# Patient Record
Sex: FEMALE | NOT HISPANIC OR LATINO | URBAN - METROPOLITAN AREA
[De-identification: names, ages, dates, MRNs, and addresses within clinical notes are randomized per-mention and may not be internally consistent; named-entity substitution may affect disease eponyms.]

---

## 2017-03-02 ENCOUNTER — IMPORTED ENCOUNTER (OUTPATIENT)
Dept: URBAN - METROPOLITAN AREA CLINIC 1 | Facility: CLINIC | Age: 42
End: 2017-03-02

## 2017-03-02 PROBLEM — H04.123: Noted: 2017-03-02

## 2017-03-02 PROCEDURE — 92015 DETERMINE REFRACTIVE STATE: CPT

## 2017-03-02 PROCEDURE — 92004 COMPRE OPH EXAM NEW PT 1/>: CPT

## 2017-03-02 NOTE — PATIENT DISCUSSION
1.  Dry Eyes OU -- Recommended to patient to use Artificial Tears BID OU. Erx Restasis BID OU 2. Return for an appointment in 1 week for Contact lens check. with Dr. Terrence Dawn. 3.  Return for an appointment in 1 year for 30 and Contact lens check. with Dr. Terrence Dawn.

## 2017-03-10 ENCOUNTER — IMPORTED ENCOUNTER (OUTPATIENT)
Dept: URBAN - METROPOLITAN AREA CLINIC 1 | Facility: CLINIC | Age: 42
End: 2017-03-10

## 2018-04-27 ENCOUNTER — IMPORTED ENCOUNTER (OUTPATIENT)
Dept: URBAN - METROPOLITAN AREA CLINIC 1 | Facility: CLINIC | Age: 43
End: 2018-04-27

## 2018-04-27 PROBLEM — H52.4: Noted: 2018-04-27

## 2018-04-27 PROCEDURE — S0621 ROUTINE OPHTHALMOLOGICAL EXA: HCPCS

## 2018-04-27 NOTE — PATIENT DISCUSSION
1.  Presbyopia: Rx was given for corrective spectacles if indicated. 2.  REESE OU-Cont using AT BID OU. Patient was unable to get Restasis secondary to cost3. Return for an appointment in 1 year for 40 and Contact lens check. with Dr. Heri Emmanuel.

## 2019-05-29 ENCOUNTER — IMPORTED ENCOUNTER (OUTPATIENT)
Dept: URBAN - METROPOLITAN AREA CLINIC 1 | Facility: CLINIC | Age: 44
End: 2019-05-29

## 2019-05-29 PROBLEM — H52.4: Noted: 2019-05-29

## 2019-05-29 PROBLEM — H52.13: Noted: 2019-05-29

## 2019-05-29 PROCEDURE — S0621 ROUTINE OPHTHALMOLOGICAL EXA: HCPCS

## 2019-05-29 NOTE — PATIENT DISCUSSION
1. Myopia -- Rx was given for correction if indicated and requested. 2. Presbyopia 3. REESE w/ PEK OU -- Cont using AT BID OU. CTL Trials given today (Air Optix Hydroglyde vs. Aqua) to improve comfort. Return for an appointment in 1 year for a 40/cc with Dr. Kale Cadet.

## 2020-06-01 ENCOUNTER — IMPORTED ENCOUNTER (OUTPATIENT)
Dept: URBAN - METROPOLITAN AREA CLINIC 1 | Facility: CLINIC | Age: 45
End: 2020-06-01

## 2020-06-01 PROBLEM — H52.13: Noted: 2020-06-01

## 2020-06-01 PROBLEM — H04.123: Noted: 2020-06-01

## 2020-06-01 PROCEDURE — S0621 ROUTINE OPHTHALMOLOGICAL EXA: HCPCS

## 2020-06-01 NOTE — PATIENT DISCUSSION
1. Myopia: Rx was given for glasses and contacts. 2. Dry Eyes OU -The use/continuation of artificial tears were recommended. 3.  Return for an appointment in 1 year for 40cc with Dr. Esmer Pitt.

## 2020-09-28 ENCOUNTER — IMPORTED ENCOUNTER (OUTPATIENT)
Dept: URBAN - METROPOLITAN AREA CLINIC 1 | Facility: CLINIC | Age: 45
End: 2020-09-28

## 2020-09-28 PROBLEM — H20.011: Noted: 2020-09-28

## 2020-09-28 PROCEDURE — 92012 INTRM OPH EXAM EST PATIENT: CPT

## 2020-09-28 NOTE — PATIENT DISCUSSION
1.  Iritis -- Primary episode OD. Begin Pred forte QID OD (Erx'd). Return immediately if symptoms worsen. Return for an appointment in 1 week 10 with Dr. Magan Cunningham.

## 2020-09-28 NOTE — PATIENT DISCUSSION
Iritis: Primary episode OD:  Pred forte QID to affected eye. Return immediately if symptoms worsen. Leg bag attached to reyes per Dr. Kumar 78.

## 2020-10-05 ENCOUNTER — IMPORTED ENCOUNTER (OUTPATIENT)
Dept: URBAN - METROPOLITAN AREA CLINIC 1 | Facility: CLINIC | Age: 45
End: 2020-10-05

## 2020-10-05 PROBLEM — H20.011: Noted: 2020-10-05

## 2020-10-05 PROCEDURE — 92012 INTRM OPH EXAM EST PATIENT: CPT

## 2020-10-05 NOTE — PATIENT DISCUSSION
1.  Iritis -- Primary episode OD. Continue Pred forte QID OD x 1 week then TID OD x 1 week (Erx'd). Return immediately if symptoms worsen. Return for an appointment in 2 weeks 10 with Dr. Nasim Mckee.

## 2020-10-21 ENCOUNTER — IMPORTED ENCOUNTER (OUTPATIENT)
Dept: URBAN - METROPOLITAN AREA CLINIC 1 | Facility: CLINIC | Age: 45
End: 2020-10-21

## 2020-10-21 PROBLEM — H20.011: Noted: 2020-10-21

## 2020-10-21 PROCEDURE — 92012 INTRM OPH EXAM EST PATIENT: CPT

## 2020-10-21 NOTE — PATIENT DISCUSSION
1.  Iritis OD -- Improved. Decrease Pred forte to BID OD x 1 week then QD OD x 1 week then d/c. Return immediately if symptoms worsen. Pt ok to resume CTL wear after 1 week2. REESE w/ PEK OU - Recommend ATs BID OU. Return for an appointment in June 2021 for a 40/cc with Dr. Joni Schaumann.

## 2021-06-02 ENCOUNTER — IMPORTED ENCOUNTER (OUTPATIENT)
Dept: URBAN - METROPOLITAN AREA CLINIC 1 | Facility: CLINIC | Age: 46
End: 2021-06-02

## 2021-06-02 PROBLEM — H20.021: Noted: 2021-06-02

## 2021-06-02 PROBLEM — H16.143: Noted: 2021-06-02

## 2021-06-02 PROBLEM — H04.123: Noted: 2021-06-02

## 2021-06-02 PROCEDURE — 92012 INTRM OPH EXAM EST PATIENT: CPT

## 2021-06-02 NOTE — PATIENT DISCUSSION
1.  Iritis OD - Change Pred Forte to affected eye TID x1 week then decreased to BID x1 week. Return immediately if symptoms worsen. Consider additional testing PRN if condition becomes more recurrent. Hold CTL wear until seen back. 2.  REESE w/ PEK OU - Continue ATs TID-QID OU routinely. Return for an appointment in 2 week 10 (iritis recheck) with Dr. Margie King.

## 2021-06-16 ENCOUNTER — IMPORTED ENCOUNTER (OUTPATIENT)
Dept: URBAN - METROPOLITAN AREA CLINIC 1 | Facility: CLINIC | Age: 46
End: 2021-06-16

## 2021-06-16 PROBLEM — H20.011: Noted: 2021-06-16

## 2021-06-16 PROCEDURE — 99213 OFFICE O/P EST LOW 20 MIN: CPT

## 2021-06-16 NOTE — PATIENT DISCUSSION
1.  Iritis OD -- Cont Pred Forte BID OD until Friday then taper to QHS OD x 1 week then D/c. May resume CTLs tomorrow. Consider HLA-B27 testing in the future if Iritis continues to flare. 2.  REESE w/ PEK OU -- Continue ATs TID OU routinely. 3. CTL Wear -- Air Optix Hydraglyde Return for an appointment as scheduled ([phone] - 40/cc) with Dr. Magan Cunningham.

## 2021-07-20 ENCOUNTER — IMPORTED ENCOUNTER (OUTPATIENT)
Dept: URBAN - METROPOLITAN AREA CLINIC 1 | Facility: CLINIC | Age: 46
End: 2021-07-20

## 2021-07-20 PROBLEM — H52.4: Noted: 2021-07-20

## 2021-07-20 PROBLEM — H52.13: Noted: 2021-07-20

## 2021-07-20 PROCEDURE — S0621 ROUTINE OPHTHALMOLOGICAL EXA: HCPCS

## 2021-07-20 NOTE — PATIENT DISCUSSION
1. Myopia: Rx was given for correction if indicated and requested. 2. Presbyopia3. REESE w/ PEK OU -- Continue ATs TID OU routinely. 4. H/o Iritis OD -- quiet today. CTL RX finalized and given to patient today. Consider MF lenses in the future. Return for an appointment in 1 year 40/cc with Dr. Leanne Jansen.

## 2022-04-02 ASSESSMENT — TONOMETRY
OS_IOP_MMHG: 11
OD_IOP_MMHG: 12
OS_IOP_MMHG: 11
OD_IOP_MMHG: 11
OD_IOP_MMHG: 10
OS_IOP_MMHG: 11
OD_IOP_MMHG: 10
OS_IOP_MMHG: 10
OD_IOP_MMHG: 10
OD_IOP_MMHG: 10
OD_IOP_MMHG: 11
OS_IOP_MMHG: 10
OS_IOP_MMHG: 11
OS_IOP_MMHG: 10
OS_IOP_MMHG: 9
OD_IOP_MMHG: 10
OD_IOP_MMHG: 10
OD_IOP_MMHG: 9

## 2022-04-02 ASSESSMENT — VISUAL ACUITY
OD_SC: 20/20
OS_SC: 20/20
OD_CC: J1
OD_SC: 20/20
OS_CC: J1+
OD_CC: J1+
OD_SC: 20/20
OD_SC: 20/20
OS_SC: 20/20
OD_CC: J1+
OS_SC: 20/20
OD_CC: J1+
OD_SC: 20/20
OS_SC: 20/20
OD_SC: 20/20
OD_CC: J1+
OS_CC: J1+
OS_SC: 20/20
OS_SC: 20/20
OD_SC: 20/20
OS_SC: 20/20
OS_CC: J1
OS_CC: J1+
OS_SC: 20/20
OS_SC: 20/20
OD_SC: 20/20
OS_SC: 20/20
OD_SC: 20/20
OD_SC: 20/20
OS_SC: 20/20
OS_CC: J1+
OD_SC: 20/20

## 2022-04-02 ASSESSMENT — KERATOMETRY
OD_K1POWER_DIOPTERS: 43.25
OS_AXISANGLE2_DEGREES: 073
OD_K2POWER_DIOPTERS: 44.00
OS_AXISANGLE_DEGREES: 163
OD_AXISANGLE_DEGREES: 021
OS_K2POWER_DIOPTERS: 44.25
OS_K1POWER_DIOPTERS: 43.50
OD_AXISANGLE2_DEGREES: 111

## 2023-01-27 ENCOUNTER — COMPREHENSIVE EXAM (OUTPATIENT)
Dept: URBAN - METROPOLITAN AREA CLINIC 1 | Facility: CLINIC | Age: 48
End: 2023-01-27

## 2023-01-27 DIAGNOSIS — H52.13: ICD-10-CM

## 2023-01-27 DIAGNOSIS — H52.4: ICD-10-CM

## 2023-01-27 DIAGNOSIS — Z01.00: ICD-10-CM

## 2023-01-27 PROCEDURE — 92014 COMPRE OPH EXAM EST PT 1/>: CPT

## 2023-01-27 PROCEDURE — 92310-2 LEVEL 2 CONTACT LENS MANAGEMENT

## 2023-01-27 PROCEDURE — 92015 DETERMINE REFRACTIVE STATE: CPT

## 2023-01-27 ASSESSMENT — TONOMETRY
OS_IOP_MMHG: 9
OD_IOP_MMHG: 9

## 2023-01-27 ASSESSMENT — VISUAL ACUITY
OD_CC: 20/20
OD_CC: J1
OS_CC: J1
OS_CC: 20/20

## 2023-01-27 NOTE — PATIENT DISCUSSION
Glasses Prescription given to patient. CTL finalized today (-2.00sph OD, -1.00sph OS) Air Optix Hydraglyde and Infuse. Patient may proceed with whichever brand preferred. Discussed MF CTL options with patient today, patient wishes to stick with  CTLs at this time.

## 2024-02-02 ENCOUNTER — COMPREHENSIVE EXAM (OUTPATIENT)
Dept: URBAN - METROPOLITAN AREA CLINIC 1 | Facility: CLINIC | Age: 49
End: 2024-02-02

## 2024-02-02 DIAGNOSIS — Z46.0: ICD-10-CM

## 2024-02-02 DIAGNOSIS — Z01.00: ICD-10-CM

## 2024-02-02 DIAGNOSIS — H52.4: ICD-10-CM

## 2024-02-02 DIAGNOSIS — H52.13: ICD-10-CM

## 2024-02-02 PROCEDURE — 92014 COMPRE OPH EXAM EST PT 1/>: CPT

## 2024-02-02 PROCEDURE — 92015 DETERMINE REFRACTIVE STATE: CPT

## 2024-02-02 PROCEDURE — 92310-12 CONTACT LENS FITTING - 90

## 2024-02-02 ASSESSMENT — VISUAL ACUITY
OS_CC: 20/20
OD_CC: 20/20

## 2024-02-02 ASSESSMENT — TONOMETRY
OS_IOP_MMHG: 11
OD_IOP_MMHG: 11

## 2024-02-23 ENCOUNTER — CONTACT LENSES/GLASSES VISIT (OUTPATIENT)
Dept: URBAN - METROPOLITAN AREA CLINIC 1 | Facility: CLINIC | Age: 49
End: 2024-02-23

## 2024-02-23 DIAGNOSIS — Z46.0: ICD-10-CM

## 2024-02-23 PROCEDURE — 92310-F CONTACT LENS FITTING FOLLOW UP

## 2024-02-23 ASSESSMENT — VISUAL ACUITY
OD_CC: J1+
OD_CC: 20/20
OS_CC: J1+
OS_CC: 20/20

## 2024-04-11 ENCOUNTER — CONTACT LENSES/GLASSES VISIT (OUTPATIENT)
Dept: URBAN - METROPOLITAN AREA CLINIC 1 | Facility: CLINIC | Age: 49
End: 2024-04-11

## 2024-04-11 DIAGNOSIS — Z46.0: ICD-10-CM

## 2024-04-11 PROCEDURE — 92310-F CONTACT LENS FITTING FOLLOW UP

## 2025-04-11 ENCOUNTER — COMPREHENSIVE EXAM (OUTPATIENT)
Age: 50
End: 2025-04-11

## 2025-04-11 DIAGNOSIS — H52.4: ICD-10-CM

## 2025-04-11 DIAGNOSIS — Z46.0: ICD-10-CM

## 2025-04-11 DIAGNOSIS — Z01.00: ICD-10-CM

## 2025-04-11 DIAGNOSIS — H52.13: ICD-10-CM

## 2025-04-11 PROCEDURE — 92014 COMPRE OPH EXAM EST PT 1/>: CPT

## 2025-04-11 PROCEDURE — 92310-3 LEVEL 3 SOFT LENS UPDATE

## 2025-04-11 PROCEDURE — 92015 DETERMINE REFRACTIVE STATE: CPT
